# Patient Record
Sex: FEMALE | Race: WHITE | NOT HISPANIC OR LATINO
[De-identification: names, ages, dates, MRNs, and addresses within clinical notes are randomized per-mention and may not be internally consistent; named-entity substitution may affect disease eponyms.]

---

## 2017-01-18 ENCOUNTER — RX RENEWAL (OUTPATIENT)
Age: 62
End: 2017-01-18

## 2017-01-25 ENCOUNTER — LABORATORY RESULT (OUTPATIENT)
Age: 62
End: 2017-01-25

## 2017-01-25 ENCOUNTER — APPOINTMENT (OUTPATIENT)
Dept: HEMATOLOGY ONCOLOGY | Facility: CLINIC | Age: 62
End: 2017-01-25

## 2017-01-25 VITALS
SYSTOLIC BLOOD PRESSURE: 123 MMHG | TEMPERATURE: 98.8 F | DIASTOLIC BLOOD PRESSURE: 68 MMHG | HEIGHT: 64.5 IN | HEART RATE: 85 BPM

## 2017-01-25 LAB
HCT VFR BLD CALC: 42.7 %
HGB BLD-MCNC: 13.9 G/DL
MCHC RBC-ENTMCNC: 27.8 PG
MCHC RBC-ENTMCNC: 32.6 GM/DL
MCV RBC AUTO: 85.3 FL
PLATELET # BLD AUTO: 257 K/UL
RBC # BLD: 5.01 M/UL
RBC # FLD: 13.4 %
WBC # FLD AUTO: 11.7 K/UL

## 2017-01-26 LAB
ALBUMIN SERPL ELPH-MCNC: 4.3 G/DL
ALP BLD-CCNC: 140 U/L
ALT SERPL-CCNC: 16 U/L
ANION GAP SERPL CALC-SCNC: 14 MMOL/L
AST SERPL-CCNC: 16 U/L
BILIRUB SERPL-MCNC: 0.4 MG/DL
BUN SERPL-MCNC: 13 MG/DL
CALCIUM SERPL-MCNC: 9.6 MG/DL
CANCER AG27-29 SERPL-ACNC: 24.5 U/ML
CHLORIDE SERPL-SCNC: 104 MMOL/L
CO2 SERPL-SCNC: 25 MMOL/L
CREAT SERPL-MCNC: 0.85 MG/DL
GLUCOSE SERPL-MCNC: 102 MG/DL
POTASSIUM SERPL-SCNC: 3.8 MMOL/L
PROT SERPL-MCNC: 6.6 G/DL
SODIUM SERPL-SCNC: 143 MMOL/L

## 2017-01-27 LAB — 25(OH)D3 SERPL-MCNC: 63.8 NG/ML

## 2017-01-30 ENCOUNTER — RX RENEWAL (OUTPATIENT)
Age: 62
End: 2017-01-30

## 2017-02-08 ENCOUNTER — APPOINTMENT (OUTPATIENT)
Dept: HEMATOLOGY ONCOLOGY | Facility: CLINIC | Age: 62
End: 2017-02-08

## 2017-02-08 VITALS — HEART RATE: 81 BPM | TEMPERATURE: 98.3 F | DIASTOLIC BLOOD PRESSURE: 78 MMHG | SYSTOLIC BLOOD PRESSURE: 114 MMHG

## 2017-08-29 ENCOUNTER — RESULT REVIEW (OUTPATIENT)
Age: 62
End: 2017-08-29

## 2018-01-25 ENCOUNTER — APPOINTMENT (OUTPATIENT)
Dept: HEMATOLOGY ONCOLOGY | Facility: CLINIC | Age: 63
End: 2018-01-25
Payer: COMMERCIAL

## 2018-01-25 ENCOUNTER — LABORATORY RESULT (OUTPATIENT)
Age: 63
End: 2018-01-25

## 2018-01-25 VITALS
TEMPERATURE: 98.3 F | HEIGHT: 64.5 IN | DIASTOLIC BLOOD PRESSURE: 71 MMHG | HEART RATE: 87 BPM | SYSTOLIC BLOOD PRESSURE: 130 MMHG

## 2018-01-25 LAB
HCT VFR BLD CALC: 44.8 %
HGB BLD-MCNC: 13.9 G/DL
MCHC RBC-ENTMCNC: 26.7 PG
MCHC RBC-ENTMCNC: 31 GM/DL
MCV RBC AUTO: 86.3 FL
PLATELET # BLD AUTO: 273 K/UL
RBC # BLD: 5.2 M/UL
RBC # FLD: 13.3 %
WBC # FLD AUTO: 10 K/UL

## 2018-01-25 PROCEDURE — 85025 COMPLETE CBC W/AUTO DIFF WBC: CPT

## 2018-01-25 PROCEDURE — 36415 COLL VENOUS BLD VENIPUNCTURE: CPT

## 2018-01-26 LAB
25(OH)D3 SERPL-MCNC: 47 NG/ML
CANCER AG27-29 SERPL-ACNC: 22.1 U/ML

## 2018-02-01 ENCOUNTER — RX RENEWAL (OUTPATIENT)
Age: 63
End: 2018-02-01

## 2018-02-02 ENCOUNTER — TRANSCRIPTION ENCOUNTER (OUTPATIENT)
Age: 63
End: 2018-02-02

## 2018-02-02 ENCOUNTER — APPOINTMENT (OUTPATIENT)
Dept: HEMATOLOGY ONCOLOGY | Facility: CLINIC | Age: 63
End: 2018-02-02
Payer: COMMERCIAL

## 2018-02-02 VITALS
TEMPERATURE: 98 F | SYSTOLIC BLOOD PRESSURE: 138 MMHG | HEART RATE: 80 BPM | HEIGHT: 64.5 IN | DIASTOLIC BLOOD PRESSURE: 78 MMHG

## 2018-02-02 PROCEDURE — 99215 OFFICE O/P EST HI 40 MIN: CPT

## 2018-02-02 RX ORDER — FLUOROMETHOLONE 1 MG/G
0.1 OINTMENT OPHTHALMIC
Qty: 4 | Refills: 0 | Status: DISCONTINUED | COMMUNITY
Start: 2017-08-24

## 2018-02-02 RX ORDER — PREDNISONE 20 MG/1
20 TABLET ORAL
Qty: 8 | Refills: 0 | Status: DISCONTINUED | COMMUNITY
Start: 2017-10-30

## 2018-02-02 RX ORDER — BENZONATATE 100 MG/1
100 CAPSULE ORAL
Qty: 30 | Refills: 0 | Status: DISCONTINUED | COMMUNITY
Start: 2017-10-30

## 2018-02-02 RX ORDER — DOXYCYCLINE 100 MG/1
100 CAPSULE ORAL
Qty: 14 | Refills: 0 | Status: DISCONTINUED | COMMUNITY
Start: 2017-10-30

## 2018-09-04 ENCOUNTER — RX RENEWAL (OUTPATIENT)
Age: 63
End: 2018-09-04

## 2019-01-10 ENCOUNTER — RX RENEWAL (OUTPATIENT)
Age: 64
End: 2019-01-10

## 2019-02-05 ENCOUNTER — RX RENEWAL (OUTPATIENT)
Age: 64
End: 2019-02-05

## 2019-02-11 PROBLEM — M85.80 OSTEOPENIA: Status: ACTIVE | Noted: 2019-02-11

## 2019-02-12 ENCOUNTER — APPOINTMENT (OUTPATIENT)
Age: 64
End: 2019-02-12
Payer: COMMERCIAL

## 2019-02-12 DIAGNOSIS — M85.80 OTHER SPECIFIED DISORDERS OF BONE DENSITY AND STRUCTURE, UNSPECIFIED SITE: ICD-10-CM

## 2019-02-13 ENCOUNTER — LABORATORY RESULT (OUTPATIENT)
Age: 64
End: 2019-02-13

## 2019-02-13 ENCOUNTER — APPOINTMENT (OUTPATIENT)
Age: 64
End: 2019-02-13
Payer: COMMERCIAL

## 2019-02-13 VITALS
TEMPERATURE: 97.4 F | HEART RATE: 99 BPM | HEIGHT: 64.5 IN | DIASTOLIC BLOOD PRESSURE: 74 MMHG | WEIGHT: 195 LBS | BODY MASS INDEX: 32.89 KG/M2 | SYSTOLIC BLOOD PRESSURE: 117 MMHG

## 2019-02-13 LAB
HCT VFR BLD CALC: 45.2 %
HGB BLD-MCNC: 14.6 G/DL
MCHC RBC-ENTMCNC: 27.6 PG
MCHC RBC-ENTMCNC: 32.4 GM/DL
MCV RBC AUTO: 85.4 FL
PLATELET # BLD AUTO: 240 K/UL
RBC # BLD: 5.29 M/UL
RBC # FLD: 12.9 %
WBC # FLD AUTO: 9.9 K/UL

## 2019-02-13 PROCEDURE — 36415 COLL VENOUS BLD VENIPUNCTURE: CPT | Mod: Q5

## 2019-02-13 PROCEDURE — 85025 COMPLETE CBC W/AUTO DIFF WBC: CPT | Mod: Q5

## 2019-02-14 LAB
25(OH)D3 SERPL-MCNC: 49.4 NG/ML
ALBUMIN SERPL ELPH-MCNC: 4.8 G/DL
ALP BLD-CCNC: 158 U/L
ALT SERPL-CCNC: 18 U/L
ANION GAP SERPL CALC-SCNC: 14 MMOL/L
AST SERPL-CCNC: 18 U/L
BILIRUB SERPL-MCNC: 0.3 MG/DL
BUN SERPL-MCNC: 11 MG/DL
CALCIUM SERPL-MCNC: 9.9 MG/DL
CANCER AG27-29 SERPL-ACNC: 24.9 U/ML
CHLORIDE SERPL-SCNC: 103 MMOL/L
CO2 SERPL-SCNC: 28 MMOL/L
CREAT SERPL-MCNC: 0.75 MG/DL
GLUCOSE SERPL-MCNC: 91 MG/DL
POTASSIUM SERPL-SCNC: 4.6 MMOL/L
PROT SERPL-MCNC: 6.9 G/DL
SODIUM SERPL-SCNC: 145 MMOL/L

## 2019-02-19 ENCOUNTER — APPOINTMENT (OUTPATIENT)
Age: 64
End: 2019-02-19
Payer: COMMERCIAL

## 2019-02-19 VITALS
SYSTOLIC BLOOD PRESSURE: 125 MMHG | HEIGHT: 64.5 IN | HEART RATE: 80 BPM | BODY MASS INDEX: 32.89 KG/M2 | TEMPERATURE: 98 F | DIASTOLIC BLOOD PRESSURE: 85 MMHG | WEIGHT: 195 LBS

## 2019-02-19 PROCEDURE — 99214 OFFICE O/P EST MOD 30 MIN: CPT

## 2019-02-19 RX ORDER — A/C/E/ZINC OX/CUPRIC OX/LUTEIN 7160-113
TABLET ORAL
Refills: 0 | Status: DISCONTINUED | COMMUNITY
End: 2019-02-19

## 2019-02-19 RX ORDER — KRILL/OM-3/DHA/EPA/PHOSPHO/AST 350MG-90MG
CAPSULE ORAL
Refills: 0 | Status: DISCONTINUED | COMMUNITY
End: 2019-02-19

## 2019-02-19 NOTE — PHYSICAL EXAM
[Fully active, able to carry on all pre-disease performance without restriction] : Status 0 - Fully active, able to carry on all pre-disease performance without restriction [Normal] : normal appearance, no rash, nodules, vesicles, ulcers, erythema [de-identified] : The right breast-well-healed periareolar scar

## 2019-02-19 NOTE — HISTORY OF PRESENT ILLNESS
[Disease: _____________________] : Disease: [unfilled] [T: ___] : T[unfilled] [N: ___] : N[unfilled] [M: ___] : M[unfilled] [AJCC Stage: ____] : AJCC Stage: [unfilled] [de-identified] : 63 postmenopausal,  female with stage I (T1, N0), ER positive/HER-2 negative right breast infiltrating ductal carcinoma diagnosed June 2010.\par \par CASE SYNOPSIS:\par 5/2010 - presented with an abnormal mammogram.\par 6/17/10 - ultimately had a lumpectomy for a 2cm infiltrating lobular carcinoma with DCIS that was ER 95%, VA 95%, HER2-. 2/2 SLN's were neg (T1N0 = stage I). She had Oncotype testing that showed a recurrence score of 29 - intermediate risk. \par 8/18/10 - 10/19/10 - adjuvant chemotherapy with Taxotere and Cytoxan x 4. \par 1/7/11 - completed XRT. \par 1/11/11 - 1/2016 - Tamoxifen.\par 1/2016 - started extended adjuvant therapy with Aromasin. [de-identified] : infiltrating ductal carcinoma [de-identified] : ER 95%, CO 95%, HER2- [FreeTextEntry1] : Aromasin [de-identified] : Patient returning for an annual examination. She is compliant with HRT (which from tamoxifen after 5 years to a Aromasin since 2016). Seems to tolerate treatment well. Denies B. symptoms. She is complaining of insomnia,as well as chronic paresthesias, but symptoms do not prevent her to have a good quality of life. Her last mammogram was August 2018 in normal limits. Denies changes in appetite or weight, hospitalization, infections.

## 2019-02-19 NOTE — ASSESSMENT
[FreeTextEntry1] : Ms. GUERRA 's questions were answered to her satisfaction. She expressed her understanding and willingness to comply with the above recommendations, and  will return to the office to review the results of the blood tests in 1 year.\par

## 2019-03-04 ENCOUNTER — TRANSCRIPTION ENCOUNTER (OUTPATIENT)
Age: 64
End: 2019-03-04

## 2019-09-12 ENCOUNTER — RESULT REVIEW (OUTPATIENT)
Age: 64
End: 2019-09-12

## 2020-01-07 ENCOUNTER — RX RENEWAL (OUTPATIENT)
Age: 65
End: 2020-01-07

## 2020-01-07 RX ORDER — MIRTAZAPINE 15 MG/1
15 TABLET, FILM COATED ORAL
Qty: 90 | Refills: 3 | Status: ACTIVE | COMMUNITY
Start: 2018-02-02 | End: 1900-01-01

## 2020-01-27 ENCOUNTER — RX RENEWAL (OUTPATIENT)
Age: 65
End: 2020-01-27

## 2020-09-26 ENCOUNTER — TRANSCRIPTION ENCOUNTER (OUTPATIENT)
Age: 65
End: 2020-09-26

## 2020-10-08 ENCOUNTER — RESULT REVIEW (OUTPATIENT)
Age: 65
End: 2020-10-08

## 2020-11-05 ENCOUNTER — TRANSCRIPTION ENCOUNTER (OUTPATIENT)
Age: 65
End: 2020-11-05

## 2020-12-07 ENCOUNTER — APPOINTMENT (OUTPATIENT)
Dept: OBGYN | Facility: CLINIC | Age: 65
End: 2020-12-07
Payer: COMMERCIAL

## 2020-12-07 PROCEDURE — 99243 OFF/OP CNSLTJ NEW/EST LOW 30: CPT

## 2020-12-07 PROCEDURE — 99072 ADDL SUPL MATRL&STAF TM PHE: CPT

## 2020-12-30 ENCOUNTER — NON-APPOINTMENT (OUTPATIENT)
Age: 65
End: 2020-12-30

## 2021-01-29 ENCOUNTER — OUTPATIENT (OUTPATIENT)
Dept: OUTPATIENT SERVICES | Facility: HOSPITAL | Age: 66
LOS: 1 days | End: 2021-01-29
Payer: COMMERCIAL

## 2021-01-29 VITALS
HEIGHT: 64 IN | OXYGEN SATURATION: 98 % | HEART RATE: 73 BPM | RESPIRATION RATE: 14 BRPM | WEIGHT: 192.02 LBS | TEMPERATURE: 98 F | DIASTOLIC BLOOD PRESSURE: 79 MMHG | SYSTOLIC BLOOD PRESSURE: 120 MMHG

## 2021-01-29 DIAGNOSIS — Z01.818 ENCOUNTER FOR OTHER PREPROCEDURAL EXAMINATION: ICD-10-CM

## 2021-01-29 DIAGNOSIS — N84.0 POLYP OF CORPUS UTERI: ICD-10-CM

## 2021-01-29 DIAGNOSIS — Z98.890 OTHER SPECIFIED POSTPROCEDURAL STATES: Chronic | ICD-10-CM

## 2021-01-29 DIAGNOSIS — Z87.39 PERSONAL HISTORY OF OTHER DISEASES OF THE MUSCULOSKELETAL SYSTEM AND CONNECTIVE TISSUE: Chronic | ICD-10-CM

## 2021-01-29 LAB
ANION GAP SERPL CALC-SCNC: 12 MMOL/L — SIGNIFICANT CHANGE UP (ref 5–17)
BUN SERPL-MCNC: 12 MG/DL — SIGNIFICANT CHANGE UP (ref 7–23)
CALCIUM SERPL-MCNC: 9.6 MG/DL — SIGNIFICANT CHANGE UP (ref 8.4–10.5)
CHLORIDE SERPL-SCNC: 105 MMOL/L — SIGNIFICANT CHANGE UP (ref 96–108)
CO2 SERPL-SCNC: 24 MMOL/L — SIGNIFICANT CHANGE UP (ref 22–31)
CREAT SERPL-MCNC: 0.8 MG/DL — SIGNIFICANT CHANGE UP (ref 0.5–1.3)
GLUCOSE SERPL-MCNC: 96 MG/DL — SIGNIFICANT CHANGE UP (ref 70–99)
HCT VFR BLD CALC: 42.8 % — SIGNIFICANT CHANGE UP (ref 34.5–45)
HGB BLD-MCNC: 13.8 G/DL — SIGNIFICANT CHANGE UP (ref 11.5–15.5)
MCHC RBC-ENTMCNC: 27.3 PG — SIGNIFICANT CHANGE UP (ref 27–34)
MCHC RBC-ENTMCNC: 32.2 GM/DL — SIGNIFICANT CHANGE UP (ref 32–36)
MCV RBC AUTO: 84.6 FL — SIGNIFICANT CHANGE UP (ref 80–100)
NRBC # BLD: 0 /100 WBCS — SIGNIFICANT CHANGE UP (ref 0–0)
PLATELET # BLD AUTO: 280 K/UL — SIGNIFICANT CHANGE UP (ref 150–400)
POTASSIUM SERPL-MCNC: 4.3 MMOL/L — SIGNIFICANT CHANGE UP (ref 3.5–5.3)
POTASSIUM SERPL-SCNC: 4.3 MMOL/L — SIGNIFICANT CHANGE UP (ref 3.5–5.3)
RBC # BLD: 5.06 M/UL — SIGNIFICANT CHANGE UP (ref 3.8–5.2)
RBC # FLD: 13.5 % — SIGNIFICANT CHANGE UP (ref 10.3–14.5)
SODIUM SERPL-SCNC: 141 MMOL/L — SIGNIFICANT CHANGE UP (ref 135–145)
WBC # BLD: 8.18 K/UL — SIGNIFICANT CHANGE UP (ref 3.8–10.5)
WBC # FLD AUTO: 8.18 K/UL — SIGNIFICANT CHANGE UP (ref 3.8–10.5)

## 2021-01-29 PROCEDURE — G0463: CPT

## 2021-01-29 PROCEDURE — 85027 COMPLETE CBC AUTOMATED: CPT

## 2021-01-29 PROCEDURE — 80048 BASIC METABOLIC PNL TOTAL CA: CPT

## 2021-01-29 RX ORDER — SODIUM CHLORIDE 9 MG/ML
3 INJECTION INTRAMUSCULAR; INTRAVENOUS; SUBCUTANEOUS EVERY 8 HOURS
Refills: 0 | Status: DISCONTINUED | OUTPATIENT
Start: 2021-02-12 | End: 2021-02-26

## 2021-01-29 RX ORDER — LIDOCAINE HCL 20 MG/ML
0.2 VIAL (ML) INJECTION ONCE
Refills: 0 | Status: DISCONTINUED | OUTPATIENT
Start: 2021-02-12 | End: 2021-02-26

## 2021-01-29 NOTE — H&P PST ADULT - NSICDXFAMILYHX_GEN_ALL_CORE_FT
FAMILY HISTORY:  FH: colon cancer, father, paternal uncle  FH: lung cancer, paternal grandmother  FH: prostate cancer, father  FHx: breast cancer, paternal aunt, paternal grandmother

## 2021-01-29 NOTE — H&P PST ADULT - ATTENDING COMMENTS
Pt consented for D&C, diagnostic hysteroscopy, polypectomy, and possible operative hysteroscopy. ALl questions answered.

## 2021-01-29 NOTE — H&P PST ADULT - OTHER CARE PROVIDERS
Oncologist-Dr. Yanique Grullon, Breast Surgeon-Dr. Susan Palleschi, OB/GYN-Lyndsey Nuñez, Endocrinologist- Dr. Fox Powell, Dr. Juma Razo

## 2021-01-29 NOTE — H&P PST ADULT - PRO ANTICIPATED DISCH DISP
History of Present Illness


H&P Date: 20





This is a 60-year-old female patient who presented to the ER with concerns of 

failure to thrive and concerns of living conditions per Visiting nurse.  Patient

was recently discharged on IV antibiotics for concerns for right foot 

osteomyelitis.  Patient is also a known brittle diabetic.  Additional medical 

history includes asthma, CAD, chest pain, heart failure, COPD, CVA, diabetes 

mellitus, deep vein thrombosis, GERD, hyperlipidemia, hypertension, 

osteoarthritis, renal disease and reoccurring extremity wounds due to diabetes 

cells and wound care clinic.  According to ER report patient was covered in 

feces and poor living conditions were noted in house.  Per patient she reports 

that she lives with caregiver.  Patient was also noted to have some wheezing per

ER admitted for COPD exacerbation.  At this time will consult infectious disease

for ongoing IV antibiotic use.  Social work services also consulted to assess 

living condition and plan for discharge.  Patient was started on Solu-Medrol for

COPD exacerbation and DuoNeb breathing treatments will order chest x-ray.  At 

this time patient denies chest pain or shortness of breath.  Patient denies 

nausea vomiting or diarrhea.  Patient denies any urinary burning or frequency





Review of Systems





Please refer to HPI otherwise unremarkable





Past Medical History


Past Medical History: Asthma, Coronary Artery Disease (CAD), Chest Pain / 

Angina, Heart Failure, COPD, CVA/TIA, Diabetes Mellitus, Deep Vein Thrombosis 

(DVT), Eye Disorder, GERD/Reflux, Hyperlipidemia, Hypertension, Myocardial 

Infarction (MI), Neurologic Disorder, Osteoarthritis (OA), Pneumonia, Renal 

Disease


Additional Past Medical History / Comment(s): IDDM (brittle), DKAs, neuropathy 

bilateral hands/feet, retinopathy bilateral eyes, cellulitis R foot, R great toe

and 2nd toe infections/amputations, current wound R foot-being seen in [facility], 

renal failure, anemia, CVAs with L sided paralysis, headaches started after 

CVAs, brain lesions, DVT R axillae, low back pain, varicosities, seizure many 

years ago (), hypothyroid, constipation, bilateral tinnitis occasionally, 

sinus problems.


Last Myocardial Infarction Date:: 


History of Any Multi-Drug Resistant Organisms: MRSA


Date of last positivie culture/infection: 18


MDRO Source:: Right Foot


Past Surgical History: Appendectomy,  Section, Cholecystectomy, Heart 

Catheterization With Stent, Hysterectomy, Orthopedic Surgery


Additional Past Surgical History / Comment(s): PCI with multiple stents, R great

toe and 2nd toe amps, debridements R foot ulcer, L shoulder surgery to remove 

bone, bronchoscopy, EGD, colonoscopy, R arm port since removed, bilateral elie

ract removals/lens implants.


Past Anesthesia/Blood Transfusion Reactions: No Reported Reaction


Additional Past Anesthesia/Blood Transfusion Reaction / Comment(s): HX OF BLOOD 

TRANSFUSION- NO REACTION


Date of Last Stent Placement:: 2013


Past Psychological History: Anxiety, Bipolar, Depression


Additional Psychological History / Comment(s): Pt has a legal guardian, Noelle Menon, who is pt's sister.  Currently her legal guardian is hospitalized.  Pt 

has a caregiver, Eleanor, who resides with her.  Pt is wheelchair bound d/t CVA 

with L sided paralysis arm and leg.  She has a shower chair and a glucometer.  

Her sister or caregiver drive her to appts.  [email or web address].


Smoking Status: Former smoker


Past Alcohol Use History: None Reported


Additional Past Alcohol Use History / Comment(s): Pt started smoking in  and

quit in .   Using marijuana edibles occasionally but none for a "long time"


Past Drug Use History: Marijuana


Additional Drug Use History / Comment(s): using marijuana edibles





- Past Family History


  ** Father


Family Medical History: Unable to Obtain, Coronary Artery Disease (CAD), 

Diabetes Mellitus





  ** Mother


Family Medical History: COPD





Medications and Allergies


                                Home Medications











 Medication  Instructions  Recorded  Confirmed  Type


 


Albuterol Inhaler [Ventolin Hfa 2 puff INHALATION RT-Q4H PRN 16 

History





Inhaler]    


 


Famotidine [Pepcid] 20 mg PO DAILY 16 History


 


Valproic Acid [Depakene] 250 mg PO DAILY 16 History


 


Ergocalciferol [Vitamin D2 50,000 unit PO SA 10/06/16 01/23/20 History





(DRISDOL)]    


 


HYDROcodone/APAP 10-325MG [Norco 1 tab PO Q6H PRN 17 History





]    


 


DULoxetine HCL [Cymbalta] 60 mg PO DAILY 17 History


 


ALPRAZolam [Xanax] 1 mg PO Q8H 17 History


 


Ondansetron [Zofran] 4 mg PO DAILY PRN 18 History


 


Atorvastatin [Lipitor] 20 mg PO DAILY 19 History


 


QUEtiapine FUMARATE [SEROquel] 25 mg PO BID 19 History


 


QUEtiapine [SEROquel] 100 mg PO HS 19 History


 


Vitamin B Complex With Vit C 1 tab PO DAILY 20 History


 


Aspirin [Adult Low Dose Aspirin EC] 81 mg PO DAILY 01/10/20 01/23/20 History


 


Ferrous Sulfate [Iron (65  mg PO DAILY 01/10/20 01/23/20 History





Elemental)]    


 


Insulin Glargine [Lantus] 12 unit SQ HS #0 20 Rx


 


DAPTOmycin [Daptomycin] 350 mg IV DAILY #40 vial 20 Rx


 


INSULIN ASPART (NovoLOG) [NovoLOG See Protocol SQ ACHS 20 History





(formulary)]    








                                    Allergies











Allergy/AdvReac Type Severity Reaction Status Date / Time


 


Barbiturates Allergy  Rash/Hives Verified 20 22:34


 


cephalexin monohydrate Allergy  Rash/Hives Verified 20 22:34





[From Keflex]     


 


morphine Allergy  Rash/Hives Verified 20 22:34


 


Penicillins Allergy  Rash/Hives Verified 20 22:34


 


phenobarbital Allergy  Swelling Verified 20 22:34


 


venom-honey bee Allergy  Swelling Verified 20 22:34





[bee venom (honey bee)]     


 


amlodipine besylate AdvReac  Vomiting Verified 20 22:34





[From Norvasc]     














Physical Exam


Vitals: 


                                   Vital Signs











  Temp Pulse Pulse Pulse Resp BP BP


 


 20 08:57   95     


 


 20 08:47       


 


 20 08:44   97     


 


 20 08:33  98.3 F    97  18   135/70


 


 20 02:50  98.1 F   92     132/68


 


 20 00:00    87   18  


 


 20 22:29      18  


 


 20 22:06   110 H    18  137/69 


 


 20 17:33  98.1 F  99    17  110/56 














  Pulse Ox


 


 20 08:57 


 


 20 08:47  97


 


 20 08:44 


 


 20 08:33  98


 


 20 02:50  98


 


 20 00:00 


 


 20 22:29 


 


 20 22:06  99


 


 20 17:33  98








                                Intake and Output











 20





 22:59 06:59 14:59


 


Intake Total  400 


 


Balance  400 


 


Intake:   


 


  Intake, IV Titration  400 





  Amount   


 


    Sodium Chloride 0.9% 1,  400 





    000 ml @ 100 mls/hr IV .   





    Q10H [facility] Rx#:[id number]   


 


Other:   


 


  Voiding Method Diaper Diaper 





 Incontinent Incontinent 


 


  # Voids 1 2 


 


  Weight 57.606 kg  57.606 kg














Head normocephalic


Neck supple


Lungs diminished bilaterally


Heart regular rate and rhythm S1-S2, no rub or gallop


Abdomen is soft nontender nondistended positive bowel sounds no 

hepatosplenomegaly


Extremities no edema.  Right foot dressing place clean dry and intact


Neuro alert and orientated to 3





Results


CBC & Chem 7: 


                                 20 22:14





                                 20 22:14


Labs: 


                  Abnormal Lab Results - Last 24 Hours (Table)











  20 Range/Units





  17:44 19:35 22:14 


 


RBC    2.94 L  (3.80-5.40)  m/uL


 


Hgb    8.8 L  (11.4-16.0)  gm/dL


 


Hct    27.4 L  (34.0-46.0)  %


 


APTT     (22.0-30.0)  sec


 


Sodium     (137-145)  mmol/L


 


BUN     (7-17)  mg/dL


 


Glucose     (74-99)  mg/dL


 


POC Glucose (mg/dL)  269 H  247 H   (75-99)  mg/dL


 


Calcium     (8.4-10.2)  mg/dL


 


Total Protein     (6.3-8.2)  g/dL


 


Albumin     (3.5-5.0)  g/dL














  20 Range/Units





  22:14 22:14 23:01 


 


RBC     (3.80-5.40)  m/uL


 


Hgb     (11.4-16.0)  gm/dL


 


Hct     (34.0-46.0)  %


 


APTT   21.9 L   (22.0-30.0)  sec


 


Sodium  135 L    (137-145)  mmol/L


 


BUN  24 H    (7-17)  mg/dL


 


Glucose  426 H    (74-99)  mg/dL


 


POC Glucose (mg/dL)    521 H  (75-99)  mg/dL


 


Calcium  8.3 L    (8.4-10.2)  mg/dL


 


Total Protein  6.1 L    (6.3-8.2)  g/dL


 


Albumin  3.0 L    (3.5-5.0)  g/dL














  20 Range/Units





  01:59 05:23 06:47 


 


RBC     (3.80-5.40)  m/uL


 


Hgb     (11.4-16.0)  gm/dL


 


Hct     (34.0-46.0)  %


 


APTT     (22.0-30.0)  sec


 


Sodium     (137-145)  mmol/L


 


BUN     (7-17)  mg/dL


 


Glucose     (74-99)  mg/dL


 


POC Glucose (mg/dL)  363 H  231 H  230 H  (75-99)  mg/dL


 


Calcium     (8.4-10.2)  mg/dL


 


Total Protein     (6.3-8.2)  g/dL


 


Albumin     (3.5-5.0)  g/dL














Thrombosis Risk Factor Assmnt





- Choose All That Apply


Any of the Below Risk Factors Present?: Yes


Each Factor Represents 1 point: Abnormal pulmonary function (COPD), Age 41-60 

years


Other Risk Factors: Yes


Each Risk Factor Represents 3 Points: History of DVT/PE


Thrombosis Risk Factor Assessment Total Risk Factor Score: 5


Thrombosis Risk Factor Assessment Level: High Risk





Assessment and Plan


Assessment: 





1.  Failure to thrive with concerns of living conditions.  Social work services 

have been consulted to assess discharge planning and home environment





2.  Right foot wound infection with osteomyelitis.  Patient recently underwent 

debridement with Dr. Dash.  Patient recently discharged on IV daptomycin per 

ID.  Infectious disease services have been consulted.  Daptomycin has been 

resumed





3.  Acute COPD exacerbation.  Patient started Solu-Medrol DuoNeb breathing 

treatments chest x-ray has been ordered





4.  Type 1 diabetes mellitus known brittle diabetic with noncompliance.  

Patient's home dose of Levemir and sliding scale insulin has been ordered





5.  History of peripheral vascular disease





6.  History of essential hypertension





7.  History of hyperlipidemia





8.  History of congestive heart failure





9.  History of coronary artery disease with previous history of angioplasty and 

stent placement





DVT prophylaxis Lovenox.  GI prophylaxis Pepcid


Infectious disease consultant continue daptomycin


Social work services consulted


Time with Patient: Greater than 30 (Greater than 60% of the total time spent in 

counseling and coordination of care. I performed an examination of the patient 

and discussed their management with the Nurse Practitioner.  I have reviewed the

Nurse Practitioner's notes and agree with the documented findings and plan of 

care)
home

## 2021-01-29 NOTE — H&P PST ADULT - NSSUBSTANCEUSE_GEN_ALL_CORE_SD
caffeine/street drug/inhalant/medication abuse marijuanna/caffeine/street drug/inhalant/medication abuse

## 2021-01-29 NOTE — H&P PST ADULT - NSICDXPASTMEDICALHX_GEN_ALL_CORE_FT
PAST MEDICAL HISTORY:  Anxiety     Breast cancer January 10th stopped medications: doing well    Insomnia     Osteopenia     Vertigo

## 2021-01-29 NOTE — H&P PST ADULT - HISTORY OF PRESENT ILLNESS
COVID swab 2/9/2021  Preop clearance from Dr. Stephens next week 65 year old female with PMH/PSH significant for breast cancer (s/p right sided lumpectomy in 2010 and hormone therapy completed 1/10/21), osteopenia, intermittent vertigo, anxiety, insomnia, and ganglion cyst removed as a child presents today for presurgical testing.  She is scheduled for a D&C, diagnostic hysteroscopy, polypectomy, and possible operative hysteroscopy on 2/12/21 with Dr. Mayur Kincaid.  She denies known recent COVID exposure, fever, chills, malaise, fatigue, n/v, abdominal pain, diarhea, nasal congestion, rhinnorhea, chest congestion, SOB, dyspnea, cough, ear pain, change in taste/smell, headaches, chest pain, and palpitations.    COVID swab 2/9/2021  Preop clearance from Dr. Stephens next week 65 year old female with PMH/PSH significant for breast cancer (s/p right sided lumpectomy in 2010 and hormone therapy completed 1/10/21), osteopenia, intermittent vertigo, anxiety, insomnia, and ganglion cyst removed as a child presents today for presurgical testing.  She is scheduled for a D&C, diagnostic hysteroscopy, polypectomy, and possible operative hysteroscopy on 2/12/21 with Dr. Mayur Kincaid.  She denies known recent COVID exposure, fever, chills, malaise, fatigue, n/v, abdominal pain, diarhea, nasal congestion, rhinnorhea, chest congestion, SOB, dyspnea, cough, ear pain, change in taste/smell, headaches, chest pain, and palpitations.    COVID swab 2/9/2021 DESTINEE Padilla

## 2021-01-29 NOTE — H&P PST ADULT - NSICDXPROBLEM_GEN_ALL_CORE_FT
PROBLEM DIAGNOSES  Problem: Polyp of corpus uteri  Assessment and Plan: scheduled for a D&C, diagnostic hysteroscopy, polypectomy, and possible operative hysteroscopy on 2/12/21 with Dr. Mayur Kincaid  -[facility] today  -COVID swab scheduled for 2/9/21 at [facility]  -Preop instructions provided and patient stated understanding. A copy of instructions was given  -No medications/vitamins DOS in am
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
## 2021-01-30 ENCOUNTER — RX RENEWAL (OUTPATIENT)
Age: 66
End: 2021-01-30

## 2021-02-02 PROBLEM — R42 DIZZINESS AND GIDDINESS: Chronic | Status: ACTIVE | Noted: 2021-01-29

## 2021-02-02 PROBLEM — M85.80 OTHER SPECIFIED DISORDERS OF BONE DENSITY AND STRUCTURE, UNSPECIFIED SITE: Chronic | Status: ACTIVE | Noted: 2021-01-29

## 2021-02-02 PROBLEM — C50.919 MALIGNANT NEOPLASM OF UNSPECIFIED SITE OF UNSPECIFIED FEMALE BREAST: Chronic | Status: ACTIVE | Noted: 2021-01-29

## 2021-02-02 PROBLEM — G47.00 INSOMNIA, UNSPECIFIED: Chronic | Status: ACTIVE | Noted: 2021-01-29

## 2021-02-02 PROBLEM — F41.9 ANXIETY DISORDER, UNSPECIFIED: Chronic | Status: ACTIVE | Noted: 2021-01-29

## 2021-02-09 ENCOUNTER — OUTPATIENT (OUTPATIENT)
Dept: OUTPATIENT SERVICES | Facility: HOSPITAL | Age: 66
LOS: 1 days | End: 2021-02-09
Payer: COMMERCIAL

## 2021-02-09 DIAGNOSIS — Z98.890 OTHER SPECIFIED POSTPROCEDURAL STATES: Chronic | ICD-10-CM

## 2021-02-09 DIAGNOSIS — Z87.39 PERSONAL HISTORY OF OTHER DISEASES OF THE MUSCULOSKELETAL SYSTEM AND CONNECTIVE TISSUE: Chronic | ICD-10-CM

## 2021-02-09 DIAGNOSIS — Z11.52 ENCOUNTER FOR SCREENING FOR COVID-19: ICD-10-CM

## 2021-02-09 LAB — SARS-COV-2 RNA SPEC QL NAA+PROBE: SIGNIFICANT CHANGE UP

## 2021-02-09 PROCEDURE — C9803: CPT

## 2021-02-09 PROCEDURE — U0003: CPT

## 2021-02-09 PROCEDURE — U0005: CPT

## 2021-02-11 ENCOUNTER — TRANSCRIPTION ENCOUNTER (OUTPATIENT)
Age: 66
End: 2021-02-11

## 2021-02-12 ENCOUNTER — RESULT REVIEW (OUTPATIENT)
Age: 66
End: 2021-02-12

## 2021-02-12 ENCOUNTER — APPOINTMENT (OUTPATIENT)
Dept: OBGYN | Facility: HOSPITAL | Age: 66
End: 2021-02-12

## 2021-02-12 ENCOUNTER — OUTPATIENT (OUTPATIENT)
Dept: OUTPATIENT SERVICES | Facility: HOSPITAL | Age: 66
LOS: 1 days | End: 2021-02-12
Payer: COMMERCIAL

## 2021-02-12 VITALS — OXYGEN SATURATION: 98 % | HEART RATE: 85 BPM

## 2021-02-12 VITALS
WEIGHT: 192.02 LBS | RESPIRATION RATE: 20 BRPM | SYSTOLIC BLOOD PRESSURE: 116 MMHG | HEIGHT: 64 IN | DIASTOLIC BLOOD PRESSURE: 76 MMHG | OXYGEN SATURATION: 100 % | HEART RATE: 75 BPM | TEMPERATURE: 98 F

## 2021-02-12 DIAGNOSIS — Z87.39 PERSONAL HISTORY OF OTHER DISEASES OF THE MUSCULOSKELETAL SYSTEM AND CONNECTIVE TISSUE: Chronic | ICD-10-CM

## 2021-02-12 DIAGNOSIS — N84.0 POLYP OF CORPUS UTERI: ICD-10-CM

## 2021-02-12 DIAGNOSIS — Z98.890 OTHER SPECIFIED POSTPROCEDURAL STATES: Chronic | ICD-10-CM

## 2021-02-12 PROCEDURE — 58558 HYSTEROSCOPY BIOPSY: CPT

## 2021-02-12 PROCEDURE — 88305 TISSUE EXAM BY PATHOLOGIST: CPT

## 2021-02-12 PROCEDURE — 88305 TISSUE EXAM BY PATHOLOGIST: CPT | Mod: 26

## 2021-02-12 RX ORDER — FENTANYL CITRATE 50 UG/ML
25 INJECTION INTRAVENOUS
Refills: 0 | Status: DISCONTINUED | OUTPATIENT
Start: 2021-02-12 | End: 2021-02-12

## 2021-02-12 RX ORDER — ALENDRONATE SODIUM 70 MG/1
1 TABLET ORAL
Qty: 0 | Refills: 0 | DISCHARGE

## 2021-02-12 RX ORDER — ONDANSETRON 8 MG/1
4 TABLET, FILM COATED ORAL ONCE
Refills: 0 | Status: DISCONTINUED | OUTPATIENT
Start: 2021-02-12 | End: 2021-02-26

## 2021-02-12 RX ORDER — LANOLIN ALCOHOL/MO/W.PET/CERES
0 CREAM (GRAM) TOPICAL
Qty: 0 | Refills: 0 | DISCHARGE

## 2021-02-12 RX ORDER — OXYCODONE HYDROCHLORIDE 5 MG/1
5 TABLET ORAL ONCE
Refills: 0 | Status: DISCONTINUED | OUTPATIENT
Start: 2021-02-12 | End: 2021-02-12

## 2021-02-12 RX ORDER — ALPRAZOLAM 0.25 MG
1 TABLET ORAL
Qty: 0 | Refills: 0 | DISCHARGE

## 2021-02-12 RX ORDER — SODIUM CHLORIDE 9 MG/ML
1000 INJECTION, SOLUTION INTRAVENOUS
Refills: 0 | Status: DISCONTINUED | OUTPATIENT
Start: 2021-02-12 | End: 2021-02-26

## 2021-02-12 NOTE — ASU DISCHARGE PLAN (ADULT/PEDIATRIC) - NURSING INSTRUCTIONS
You were given Motrin/Advil in the operating room. You may take it again at 2:00pm. You can take Tylenol again at 1:20pm if needed.

## 2021-02-12 NOTE — ASU DISCHARGE PLAN (ADULT/PEDIATRIC) - CARE PROVIDER_API CALL
Mayur Kincaid)  Obstetrics and Gynecology  [address] [place] [address], [address]  [place] [address]  Phone: [phone]  Fax: [phone]  Follow Up Time: 2 weeks

## 2021-02-12 NOTE — ASU DISCHARGE PLAN (ADULT/PEDIATRIC) - CALL YOUR DOCTOR IF YOU HAVE ANY OF THE FOLLOWING:
Bleeding that does not stop/Pain not relieved by Medications/Fever greater than (need to indicate Fahrenheit or Celsius)/Wound/Surgical Site with redness, or foul smelling discharge or pus/Nausea and vomiting that does not stop/Unable to urinate/Excessive diarrhea

## 2021-02-12 NOTE — BRIEF OPERATIVE NOTE - NSICDXBRIEFPROCEDURE_GEN_ALL_CORE_FT
PROCEDURES:  Dilation and curettage, uterus 12-Feb-2021 08:13:04  Pebbles Nails  Polypectomy, uterus, hysteroscopic 12-Feb-2021 08:12:42  Pebbles Nails  Diagnostic hysteroscopy 12-Feb-2021 08:12:29  Pebbles Nails

## 2021-02-12 NOTE — BRIEF OPERATIVE NOTE - OPERATION/FINDINGS
Exam under anesthesia notable for small, mobile, axial uterus, hysteroscopy findings notable for atrophic appearing endometrial cavity with pedunculated polyp along posterior/lateral wall of endometrium at 9:00 - resected with good hemostasis following intervention. Cervix with moderate bleeding following removal of tenaculum, repaired with 2 stitches of vicryl - good hemostasis appreciated following this intervention. Exam under anesthesia notable for small, mobile, axial uterus, hysteroscopy findings notable for atrophic appearing endometrial cavity with pedunculated polyp along posterior/lateral wall of endometrium at 9:00 - resected with good hemostasis following intervention. Cervix with mild bleeding following removal of tenaculum, repaired with 2 stitches of vicryl - good hemostasis appreciated following this intervention.

## 2021-02-17 LAB — SURGICAL PATHOLOGY STUDY: SIGNIFICANT CHANGE UP

## 2021-02-24 ENCOUNTER — APPOINTMENT (OUTPATIENT)
Dept: OBGYN | Facility: CLINIC | Age: 66
End: 2021-02-24

## 2021-03-13 ENCOUNTER — OUTPATIENT (OUTPATIENT)
Dept: OUTPATIENT SERVICES | Facility: HOSPITAL | Age: 66
LOS: 1 days | Discharge: HOME | End: 2021-03-13

## 2021-03-13 DIAGNOSIS — Z87.39 PERSONAL HISTORY OF OTHER DISEASES OF THE MUSCULOSKELETAL SYSTEM AND CONNECTIVE TISSUE: Chronic | ICD-10-CM

## 2021-03-13 DIAGNOSIS — Z08 ENCOUNTER FOR FOLLOW-UP EXAMINATION AFTER COMPLETED TREATMENT FOR MALIGNANT NEOPLASM: ICD-10-CM

## 2021-03-13 DIAGNOSIS — Z98.890 OTHER SPECIFIED POSTPROCEDURAL STATES: Chronic | ICD-10-CM

## 2021-03-13 DIAGNOSIS — Z51.81 ENCOUNTER FOR THERAPEUTIC DRUG LEVEL MONITORING: ICD-10-CM

## 2021-03-15 ENCOUNTER — APPOINTMENT (OUTPATIENT)
Dept: HEMATOLOGY ONCOLOGY | Facility: CLINIC | Age: 66
End: 2021-03-15
Payer: COMMERCIAL

## 2021-03-15 VITALS
HEART RATE: 87 BPM | SYSTOLIC BLOOD PRESSURE: 133 MMHG | DIASTOLIC BLOOD PRESSURE: 80 MMHG | BODY MASS INDEX: 31.87 KG/M2 | HEIGHT: 64.5 IN | WEIGHT: 189 LBS | TEMPERATURE: 98.3 F | RESPIRATION RATE: 16 BRPM

## 2021-03-15 DIAGNOSIS — Z08 ENCOUNTER FOR FOLLOW-UP EXAMINATION AFTER COMPLETED TREATMENT FOR MALIGNANT NEOPLASM: ICD-10-CM

## 2021-03-15 DIAGNOSIS — Z79.899 ENCOUNTER FOR THERAPEUTIC DRUG LVL MONITORING: ICD-10-CM

## 2021-03-15 DIAGNOSIS — Z51.81 ENCOUNTER FOR THERAPEUTIC DRUG LVL MONITORING: ICD-10-CM

## 2021-03-15 DIAGNOSIS — Z85.3 ENCOUNTER FOR FOLLOW-UP EXAMINATION AFTER COMPLETED TREATMENT FOR MALIGNANT NEOPLASM: ICD-10-CM

## 2021-03-15 PROCEDURE — 99214 OFFICE O/P EST MOD 30 MIN: CPT

## 2021-03-15 NOTE — ASSESSMENT
[FreeTextEntry1] : 66 y/o woman with hx of stage I  lobular right  breast cancer in 2010 ERE/WA +, HER 2neg, intermediate Oncotype. S/p R lumpectomy SNB, adjuvant TX x 4, RT, 10 years of hormonal therapy ( Tamoxifen- exemestane) completed Jan 2021.\par \par Clinically HERB.\par Discussed good prognosis at this point and extremely low risk of recurrence.\par She will follow up for annual exam/ imaging with breast surgery. She goes to PCP for physical, GYN for yearly exam and endocrinology ( osteoporosis treatment).\par Return visit here PRN

## 2021-03-15 NOTE — HISTORY OF PRESENT ILLNESS
[Disease: _____________________] : Disease: [unfilled] [T: ___] : T[unfilled] [N: ___] : N[unfilled] [M: ___] : M[unfilled] [AJCC Stage: ____] : AJCC Stage: [unfilled] [de-identified] : 63 postmenopausal,  female with stage I (T1, N0), ER positive/HER-2 negative right breast infiltrating ductal carcinoma diagnosed June 2010.\par \par CASE SYNOPSIS:\par 5/2010 - presented with an abnormal mammogram.\par 6/17/10 - ultimately had a lumpectomy for a 2cm infiltrating lobular carcinoma with DCIS that was ER 95%, HI 95%, HER2-. 2/2 SLN's were neg (T1N0 = stage I). She had Oncotype testing that showed a recurrence score of 29 - intermediate risk. \par 8/18/10 - 10/19/10 - adjuvant chemotherapy with Taxotere and Cytoxan x 4. \par 1/7/11 - completed XRT. \par 1/11/11 - 1/2016 - Tamoxifen.\par 1/2016 - Jan 2021 extended adjuvant therapy with Aromasin. [de-identified] : infiltrating ductal carcinoma [de-identified] : ER 95%, [place] 95%, HER2- [FreeTextEntry1] : Aromasin [de-identified] : feels well. NO new issues/ complaints. Bowel frequency in AM- for 5-6 years-extensively evaluated, ? diet related.  Mammo/ sono yearly ~ August per breast surgery. Has appt with breast surgry next week for annual exam.\par GYN up to date. \par \par

## 2021-03-15 NOTE — PHYSICAL EXAM
[Fully active, able to carry on all pre-disease performance without restriction] : Status 0 - Fully active, able to carry on all pre-disease performance without restriction [Normal] : affect appropriate [de-identified] : The right breast-well-healed periareolar scar and axilalry scar, no suspicious masses, no axillary adenopathy Left breast no masses

## 2022-01-04 ENCOUNTER — RESULT REVIEW (OUTPATIENT)
Age: 67
End: 2022-01-04

## 2023-07-17 NOTE — H&P PST ADULT - PRETERM DELIVERIES, OB PROFILE
Thank you for letting us take care of you. You have been evaluated for knee pain/injury. Please follow-up with orthopedics. A referral has been placed. Please return for worsening symptoms. Please remain nonweightbearing on your left lower extremity till seen by orthopedics. At this time, you have no clinical evidence of symptoms or problems that will require hospitalization, however you should be evaluated soon by a primary care physician, and contact information has been provided. Follow up with your primary care physician. This is important as many medical conditions can be managed as an outpatient, in addition to routine health screening. Seeing your primary doctor often can help identify changes in the medical issue that brought you to the ED for care today. If you experience any new symptoms or acute worsening of current symptoms, please return to the ED. 0

## 2023-08-02 DIAGNOSIS — C50.919 MALIGNANT NEOPLASM OF UNSPECIFIED SITE OF UNSPECIFIED FEMALE BREAST: ICD-10-CM

## 2024-03-12 NOTE — H&P PST ADULT - ENDOCRINE
Education Record    Learner:  Patient and Spouse    Disease / Diagnosis: iron deficiency anemia     Barriers / Limitations:  None   Comments:    Method:  Discussion   Comments:    General Topics:  Medication, Side effects and symptom management, Plan of care reviewed, and Fall risk and prevention   Comments:    Outcome:  Shows understanding   Comments:    Pt here for venofer 300 mg iron infusion. Tolerated well. Next infusion appts scheduled - pt states he is active on MyChart. Discharged in stable condition.  
negative

## 2024-06-05 PROBLEM — Z85.3 PERSONAL HISTORY OF MALIGNANT NEOPLASM OF BREAST: Status: ACTIVE | Noted: 2024-06-05

## 2024-06-05 PROBLEM — R92.8 ABNORMAL FINDING ON BREAST IMAGING: Status: ACTIVE | Noted: 2024-06-05

## 2024-06-07 ENCOUNTER — APPOINTMENT (OUTPATIENT)
Dept: PLASTIC SURGERY | Facility: CLINIC | Age: 69
End: 2024-06-07
Payer: COMMERCIAL

## 2024-06-07 VITALS
WEIGHT: 189 LBS | DIASTOLIC BLOOD PRESSURE: 78 MMHG | TEMPERATURE: 97.9 F | SYSTOLIC BLOOD PRESSURE: 118 MMHG | HEIGHT: 64.5 IN | HEART RATE: 84 BPM | OXYGEN SATURATION: 97 % | BODY MASS INDEX: 31.87 KG/M2

## 2024-06-07 DIAGNOSIS — R92.8 OTHER ABNORMAL AND INCONCLUSIVE FINDINGS ON DIAGNOSTIC IMAGING OF BREAST: ICD-10-CM

## 2024-06-07 DIAGNOSIS — Z85.3 PERSONAL HISTORY OF MALIGNANT NEOPLASM OF BREAST: ICD-10-CM

## 2024-06-07 PROCEDURE — 99213 OFFICE O/P EST LOW 20 MIN: CPT

## 2024-06-07 NOTE — PHYSICAL EXAM
[Normocephalic] : normocephalic [Atraumatic] : atraumatic [EOMI] : extra ocular movement intact [Sclera nonicteric] : sclera nonicteric [Supple] : supple [No Supraclavicular Adenopathy] : no supraclavicular adenopathy [No Cervical Adenopathy] : no cervical adenopathy [No Thyromegaly] : no thyromegaly [Examined in the supine and seated position] : examined in the supine and seated position [Asymmetrical] : asymmetrical [No dominant masses] : no dominant masses in right breast  [No dominant masses] : no dominant masses left breast [No Nipple Retraction] : no left nipple retraction [No Nipple Discharge] : no left nipple discharge [No Axillary Lymphadenopathy] : no left axillary lymphadenopathy [No Edema] : no edema [No Rashes] : no rashes [No Ulceration] : no ulceration [de-identified] : The left breast is larger than her right due to treatment effect.  [de-identified] : Right breast periareolar incision well-healed with puckering skin inwards which is chronic since surgery. [de-identified] : Left breast with known palpable mass 8:00, (N7cm) round, mobile, measuring 1.9 x 1.5 cm, decreased, corresponding to benign findings on imaging.

## 2024-06-07 NOTE — HISTORY OF PRESENT ILLNESS
[FreeTextEntry1] : 68 year old female here for a follow up office visit. She has a history of Stage I right breast cancer status post right lumpectomy and sentinel lymph node biopsy 6/17/2010. Rad ONC: Dr. Neal, completed XRT Med ONC: Dr. Flores, completed adjuvant chemotherapy with TC followed by 10 years of endocrine therapy (Tamoxifen x 5 years then Exemestane x 5 years). She has a family history of breast cancer in her paternal aunt in her 60s.  Her father had colon and prostate cancer. No genetic testing conducted. 9/2/2023 Bilateral mammogram: There are scattered areas of fibroglandular density.  No suspicious masses, calcifications, or other findings are seen.  Stable postsurgical change of the right retroareolar breast and axilla with overlying scar markers.  Biopsy marker of the inner right breast.  Coarse benign calcifications of the right breast.  Stable mass of the lower inner left breast.  Stable low-density rounded asymmetry of the inner left breast.  Stable biopsy markers of the left breast. Bilateral ultrasound right retroareolar 9:00 stable distortion at the site of scar.  Left 8:00, 6 cm from the nipple, 2.4 x 1.4 x 2.6 cm stable circumscribed mass.  Breast BI-RADS 2 She feels the left palpable mass is smaller.

## 2024-06-07 NOTE — CONSULT LETTER
[Dear  ___] : Dear  [unfilled], [Courtesy Letter:] : I had the pleasure of seeing your patient, [unfilled], in my office today. [Please see my note below.] : Please see my note below. [Consult Closing:] : Thank you very much for allowing me to participate in the care of this patient.  If you have any questions, please do not hesitate to contact me. [Sincerely,] : Sincerely, [DrElvie  ___] : Dr. KEE [FreeTextEntry3] : Susan M. Palleschi, MD, FACS Division of Breast Surgery Director, Breast Surgery [address] [address] (Phone) [phone] (Fax) [phone]

## 2024-06-07 NOTE — ASSESSMENT
[FreeTextEntry1] : History of right breast cancer (Stage I, 6/2010) No evidence of disease recurrence on clinical breast exam  1. Annual bilateral mammogram and breast ultrasound due 9/2024 2. Follow up office visit due in 1 year 3. Advised monthly self breast examinations and advised her to contact me if she has any concerns.

## 2025-01-24 DIAGNOSIS — Z85.3 PERSONAL HISTORY OF MALIGNANT NEOPLASM OF BREAST: ICD-10-CM
